# Patient Record
Sex: FEMALE | Race: WHITE
[De-identification: names, ages, dates, MRNs, and addresses within clinical notes are randomized per-mention and may not be internally consistent; named-entity substitution may affect disease eponyms.]

---

## 2018-09-08 ENCOUNTER — HOSPITAL ENCOUNTER (EMERGENCY)
Age: 48
LOS: 1 days | Discharge: ANOTHER HOSPITAL | End: 2018-09-09
Payer: MEDICAID

## 2018-09-08 VITALS
HEART RATE: 93 BPM | DIASTOLIC BLOOD PRESSURE: 64 MMHG | SYSTOLIC BLOOD PRESSURE: 105 MMHG | OXYGEN SATURATION: 94 % | RESPIRATION RATE: 23 BRPM

## 2018-09-08 VITALS
OXYGEN SATURATION: 96 % | RESPIRATION RATE: 20 BRPM | SYSTOLIC BLOOD PRESSURE: 135 MMHG | HEART RATE: 97 BPM | DIASTOLIC BLOOD PRESSURE: 80 MMHG

## 2018-09-08 VITALS
HEART RATE: 100 BPM | TEMPERATURE: 102.74 F | SYSTOLIC BLOOD PRESSURE: 130 MMHG | RESPIRATION RATE: 24 BRPM | OXYGEN SATURATION: 83 % | DIASTOLIC BLOOD PRESSURE: 74 MMHG

## 2018-09-08 VITALS — OXYGEN SATURATION: 96 % | TEMPERATURE: 100.3 F

## 2018-09-08 VITALS
DIASTOLIC BLOOD PRESSURE: 76 MMHG | TEMPERATURE: 98.3 F | HEART RATE: 88 BPM | OXYGEN SATURATION: 99 % | RESPIRATION RATE: 19 BRPM | SYSTOLIC BLOOD PRESSURE: 101 MMHG

## 2018-09-08 VITALS
TEMPERATURE: 98.3 F | HEART RATE: 88 BPM | SYSTOLIC BLOOD PRESSURE: 101 MMHG | DIASTOLIC BLOOD PRESSURE: 76 MMHG | OXYGEN SATURATION: 93 % | RESPIRATION RATE: 21 BRPM

## 2018-09-08 VITALS — TEMPERATURE: 100 F

## 2018-09-08 VITALS — RESPIRATION RATE: 24 BRPM | HEART RATE: 95 BPM

## 2018-09-08 VITALS
OXYGEN SATURATION: 92 % | SYSTOLIC BLOOD PRESSURE: 111 MMHG | RESPIRATION RATE: 20 BRPM | DIASTOLIC BLOOD PRESSURE: 74 MMHG | HEART RATE: 98 BPM

## 2018-09-08 VITALS — BODY MASS INDEX: 54.4 KG/M2

## 2018-09-08 DIAGNOSIS — Z79.899: ICD-10-CM

## 2018-09-08 DIAGNOSIS — Z87.442: ICD-10-CM

## 2018-09-08 DIAGNOSIS — Z90.5: ICD-10-CM

## 2018-09-08 DIAGNOSIS — F43.10: ICD-10-CM

## 2018-09-08 DIAGNOSIS — J18.9: ICD-10-CM

## 2018-09-08 DIAGNOSIS — N39.0: ICD-10-CM

## 2018-09-08 DIAGNOSIS — A41.9: Primary | ICD-10-CM

## 2018-09-08 DIAGNOSIS — F41.0: ICD-10-CM

## 2018-09-08 LAB
ALANINE AMINOTRANSFER ALT/SGPT: 18 U/L (ref 13–56)
ALBUMIN SERPL-MCNC: 2.5 G/DL (ref 3.2–5)
ALKALINE PHOSPHATASE: 74 U/L (ref 45–117)
ANION GAP: 8 (ref 5–15)
APTT PPP: 44.5 SECONDS (ref 24.1–36.2)
AST(SGOT): 20 U/L (ref 15–37)
BUN SERPL-MCNC: 5 MG/DL (ref 7–18)
BUN/CREAT RATIO: 5.9 RATIO (ref 10–20)
CALCIUM SERPL-MCNC: 7.6 MG/DL (ref 8.5–10.1)
CARBON DIOXIDE: 26 MMOL/L (ref 21–32)
CHLORIDE: 106 MMOL/L (ref 98–107)
DEPRECATED RDW RBC: 46.8 FL (ref 35.1–43.9)
DIFFERENTIAL INDICATED: (no result)
ERYTHROCYTE [DISTWIDTH] IN BLOOD: 14.3 % (ref 11.6–14.6)
EST GLOM FILT RATE - AFR AMER: 93 ML/MIN (ref 60–?)
ESTIMATED CREATININE CLEARANCE: 65.41 ML/MIN
GLOBULIN: 3.6 G/DL (ref 2.2–4.2)
GLUCOSE: 115 MG/DL (ref 74–106)
HCT VFR BLD AUTO: 33 % (ref 37–47)
HEMOGLOBIN: 11.3 G/DL (ref 12–15)
HGB BLD-MCNC: 11.3 G/DL (ref 12–15)
IMMATURE GRANULOCYTES COUNT: 0.03 X10^3/UL (ref 0–0)
LEUKOCYTE ESTERASE UR QL STRIP: 25 /UL
MCV RBC: 93 FL (ref 81–99)
MEAN CORP HGB CONC: 34.2 G/GL (ref 32–36)
MEAN PLATELET VOL.: 10.4 FL (ref 6.2–12)
MUCOUS THREADS URNS QL MICRO: (no result) /HPF
PLATELET # BLD: 184 K/MM3 (ref 150–450)
PLATELET COUNT: 184 K/MM3 (ref 150–450)
POSITIVE COUNT: NO
POSITIVE DIFFERENTIAL: NO
POSITIVE MORPHOLOGY: NO
POTASSIUM: 3.4 MMOL/L (ref 3.5–5.1)
PROT UR QL STRIP.AUTO: 30 MG/DL
PROTHROMBIN TIME (PROTIME)PT.: 15.8 SECONDS (ref 11.7–14.9)
RBC # BLD AUTO: 3.55 M/MM3 (ref 4.2–5.4)
RBC DISTRIBUTION WIDTH CV: 14.3 % (ref 11.6–14.6)
RBC DISTRIBUTION WIDTH SD: 46.8 FL (ref 35.1–43.9)
RBC UR QL: (no result) /HPF (ref 0–5)
RBC UR QL: 250 /UL
SP GR UR: 1 (ref 1–1.03)
SQUAMOUS URNS QL MICRO: (no result) /HPF (ref 5–10)
URINE PRESERVATIVE: (no result)
WBC # BLD AUTO: 11.7 K/MM3 (ref 4.4–11)
WHITE BLOOD COUNT: 11.7 K/MM3 (ref 4.4–11)

## 2018-09-08 PROCEDURE — 94640 AIRWAY INHALATION TREATMENT: CPT

## 2018-09-08 PROCEDURE — 93005 ELECTROCARDIOGRAM TRACING: CPT

## 2018-09-08 PROCEDURE — 85610 PROTHROMBIN TIME: CPT

## 2018-09-08 PROCEDURE — 81001 URINALYSIS AUTO W/SCOPE: CPT

## 2018-09-08 PROCEDURE — 96375 TX/PRO/DX INJ NEW DRUG ADDON: CPT

## 2018-09-08 PROCEDURE — 80053 COMPREHEN METABOLIC PANEL: CPT

## 2018-09-08 PROCEDURE — 85025 COMPLETE CBC W/AUTO DIFF WBC: CPT

## 2018-09-08 PROCEDURE — 87040 BLOOD CULTURE FOR BACTERIA: CPT

## 2018-09-08 PROCEDURE — 83605 ASSAY OF LACTIC ACID: CPT

## 2018-09-08 PROCEDURE — A4216 STERILE WATER/SALINE, 10 ML: HCPCS

## 2018-09-08 PROCEDURE — 99285 EMERGENCY DEPT VISIT HI MDM: CPT

## 2018-09-08 PROCEDURE — 96376 TX/PRO/DX INJ SAME DRUG ADON: CPT

## 2018-09-08 PROCEDURE — 96368 THER/DIAG CONCURRENT INF: CPT

## 2018-09-08 PROCEDURE — 87086 URINE CULTURE/COLONY COUNT: CPT

## 2018-09-08 PROCEDURE — 71045 X-RAY EXAM CHEST 1 VIEW: CPT

## 2018-09-08 PROCEDURE — 96361 HYDRATE IV INFUSION ADD-ON: CPT

## 2018-09-08 PROCEDURE — 96365 THER/PROPH/DIAG IV INF INIT: CPT

## 2018-09-08 PROCEDURE — 85730 THROMBOPLASTIN TIME PARTIAL: CPT

## 2018-09-08 PROCEDURE — 96374 THER/PROPH/DIAG INJ IV PUSH: CPT

## 2018-09-09 PROBLEM — J18.9 PNEUMONIA: Status: ACTIVE | Noted: 2018-09-09

## 2019-05-30 ENCOUNTER — OFFICE VISIT (OUTPATIENT)
Dept: FAMILY MEDICINE CLINIC | Age: 49
End: 2019-05-30
Payer: COMMERCIAL

## 2019-05-30 VITALS
SYSTOLIC BLOOD PRESSURE: 110 MMHG | RESPIRATION RATE: 16 BRPM | DIASTOLIC BLOOD PRESSURE: 78 MMHG | HEART RATE: 72 BPM | HEIGHT: 62 IN | WEIGHT: 123.4 LBS | BODY MASS INDEX: 22.71 KG/M2

## 2019-05-30 DIAGNOSIS — G89.29 CHRONIC BACK PAIN, UNSPECIFIED BACK LOCATION, UNSPECIFIED BACK PAIN LATERALITY: ICD-10-CM

## 2019-05-30 DIAGNOSIS — M79.7 FIBROMYALGIA: ICD-10-CM

## 2019-05-30 DIAGNOSIS — Z12.4 CERVICAL CANCER SCREENING: ICD-10-CM

## 2019-05-30 DIAGNOSIS — M19.90 ARTHRITIS: ICD-10-CM

## 2019-05-30 DIAGNOSIS — M54.9 CHRONIC BACK PAIN, UNSPECIFIED BACK LOCATION, UNSPECIFIED BACK PAIN LATERALITY: ICD-10-CM

## 2019-05-30 DIAGNOSIS — Z00.00 ROUTINE HEALTH MAINTENANCE: Primary | ICD-10-CM

## 2019-05-30 DIAGNOSIS — G62.9 NEUROPATHY: ICD-10-CM

## 2019-05-30 DIAGNOSIS — Z12.31 SCREENING MAMMOGRAM, ENCOUNTER FOR: ICD-10-CM

## 2019-05-30 DIAGNOSIS — F51.01 PRIMARY INSOMNIA: ICD-10-CM

## 2019-05-30 PROCEDURE — 99386 PREV VISIT NEW AGE 40-64: CPT | Performed by: NURSE PRACTITIONER

## 2019-05-30 RX ORDER — PREGABALIN 300 MG/1
300 CAPSULE ORAL 2 TIMES DAILY
Qty: 60 CAPSULE | Refills: 0 | Status: SHIPPED | OUTPATIENT
Start: 2019-05-30 | End: 2019-06-29

## 2019-05-30 RX ORDER — ZOLPIDEM TARTRATE 10 MG/1
10 TABLET ORAL NIGHTLY PRN
Qty: 30 TABLET | Refills: 0 | Status: SHIPPED | OUTPATIENT
Start: 2019-05-30 | End: 2019-06-29

## 2019-05-30 RX ORDER — LAMOTRIGINE 25 MG/1
TABLET ORAL
Refills: 1 | COMMUNITY
Start: 2019-04-17

## 2019-05-30 RX ORDER — CARISOPRODOL 350 MG/1
350 TABLET ORAL 4 TIMES DAILY PRN
Qty: 120 TABLET | Refills: 0 | Status: SHIPPED | OUTPATIENT
Start: 2019-05-30 | End: 2019-06-29

## 2019-05-30 RX ORDER — PREGABALIN 150 MG/1
300 CAPSULE ORAL 2 TIMES DAILY
Qty: 120 CAPSULE | Refills: 0 | Status: CANCELLED | OUTPATIENT
Start: 2019-05-30 | End: 2019-06-29

## 2019-05-30 RX ORDER — TRAMADOL HYDROCHLORIDE 50 MG/1
50 TABLET ORAL
Qty: 150 TABLET | Refills: 0 | Status: CANCELLED | OUTPATIENT
Start: 2019-05-30 | End: 2019-06-29

## 2019-05-30 ASSESSMENT — PATIENT HEALTH QUESTIONNAIRE - PHQ9
SUM OF ALL RESPONSES TO PHQ QUESTIONS 1-9: 0
SUM OF ALL RESPONSES TO PHQ QUESTIONS 1-9: 0
1. LITTLE INTEREST OR PLEASURE IN DOING THINGS: 0
SUM OF ALL RESPONSES TO PHQ9 QUESTIONS 1 & 2: 0
2. FEELING DOWN, DEPRESSED OR HOPELESS: 0

## 2019-05-30 ASSESSMENT — ENCOUNTER SYMPTOMS
GASTROINTESTINAL NEGATIVE: 1
RESPIRATORY NEGATIVE: 1
BACK PAIN: 1
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
## 2019-07-23 ENCOUNTER — TELEPHONE (OUTPATIENT)
Dept: FAMILY MEDICINE CLINIC | Age: 49
End: 2019-07-23

## 2019-08-14 ENCOUNTER — TELEPHONE (OUTPATIENT)
Dept: FAMILY MEDICINE CLINIC | Age: 49
End: 2019-08-14

## 2019-08-14 NOTE — LETTER
[address]  [address]  Phone: [phone]  Fax: [phone]    ALFA Davis - MARGARET        August 14, 2019    [address] [address]      Dear Pepito Ashby: This letter is a reminder that you have incomplete lab work  recommended by me. Please call our office to schedule an appointment. If you've already underwent or scheduled these procedures, please call our office so we can obtain records. If you have any questions or concerns, please don't hesitate to call.     Sincerely,        ALFA Davis - CNP